# Patient Record
Sex: MALE | Race: WHITE | NOT HISPANIC OR LATINO
[De-identification: names, ages, dates, MRNs, and addresses within clinical notes are randomized per-mention and may not be internally consistent; named-entity substitution may affect disease eponyms.]

---

## 2023-02-21 ENCOUNTER — NON-APPOINTMENT (OUTPATIENT)
Age: 48
End: 2023-02-21

## 2023-02-21 ENCOUNTER — APPOINTMENT (OUTPATIENT)
Dept: PHYSICAL MEDICINE AND REHAB | Facility: CLINIC | Age: 48
End: 2023-02-21
Payer: COMMERCIAL

## 2023-02-21 DIAGNOSIS — Z87.39 PERSONAL HISTORY OF OTHER DISEASES OF THE MUSCULOSKELETAL SYSTEM AND CONNECTIVE TISSUE: ICD-10-CM

## 2023-02-21 DIAGNOSIS — M50.20 OTHER CERVICAL DISC DISPLACEMENT, UNSPECIFIED CERVICAL REGION: ICD-10-CM

## 2023-02-21 PROBLEM — Z00.00 ENCOUNTER FOR PREVENTIVE HEALTH EXAMINATION: Status: ACTIVE | Noted: 2023-02-21

## 2023-02-21 PROCEDURE — 72040 X-RAY EXAM NECK SPINE 2-3 VW: CPT

## 2023-02-21 PROCEDURE — 99203 OFFICE O/P NEW LOW 30 MIN: CPT

## 2023-02-21 RX ORDER — LIDOCAINE 5% 700 MG/1
5 PATCH TOPICAL
Qty: 10 | Refills: 0 | Status: DISCONTINUED | COMMUNITY
Start: 2023-01-20

## 2023-02-21 RX ORDER — COLCHICINE 0.6 MG/1
CAPSULE ORAL
Refills: 0 | Status: DISCONTINUED | COMMUNITY

## 2023-02-21 RX ORDER — MOMETASONE FUROATE 1 MG/G
0.1 OINTMENT TOPICAL
Qty: 45 | Refills: 0 | Status: DISCONTINUED | COMMUNITY
Start: 2022-08-24

## 2023-02-21 RX ORDER — COLCHICINE 0.6 MG/1
0.6 TABLET ORAL
Qty: 60 | Refills: 0 | Status: DISCONTINUED | COMMUNITY
Start: 2023-01-16

## 2023-02-21 RX ORDER — INDOMETHACIN 25 MG/1
25 CAPSULE ORAL
Qty: 60 | Refills: 0 | Status: DISCONTINUED | COMMUNITY
Start: 2022-11-11

## 2023-02-21 RX ORDER — FEBUXOSTAT 40 MG/1
40 TABLET ORAL
Qty: 30 | Refills: 0 | Status: DISCONTINUED | COMMUNITY
Start: 2022-12-29

## 2023-02-21 RX ORDER — COLCHICINE 0.6 MG/1
0.6 CAPSULE ORAL
Qty: 60 | Refills: 0 | Status: DISCONTINUED | COMMUNITY
Start: 2022-11-29

## 2023-02-21 RX ORDER — MELOXICAM 7.5 MG/1
7.5 TABLET ORAL TWICE DAILY
Qty: 50 | Refills: 0 | Status: ACTIVE | COMMUNITY
Start: 2023-02-21 | End: 1900-01-01

## 2023-02-21 RX ORDER — COLCHICINE 0.6 MG/1
0.6 CAPSULE ORAL
Refills: 0 | Status: ACTIVE | COMMUNITY

## 2023-02-21 RX ORDER — METHYLPREDNISOLONE 4 MG/1
4 TABLET ORAL
Qty: 21 | Refills: 0 | Status: DISCONTINUED | COMMUNITY
Start: 2023-01-20

## 2023-02-21 RX ORDER — CYCLOBENZAPRINE HYDROCHLORIDE 5 MG/1
5 TABLET, FILM COATED ORAL
Qty: 30 | Refills: 0 | Status: DISCONTINUED | COMMUNITY
Start: 2023-01-20

## 2023-02-21 NOTE — HISTORY OF PRESENT ILLNESS
[FreeTextEntry1] : Location: back \par Severity: 2/10\par Duration: 4 wk\par Context: moved recently but movers left everything outside so he had to lift it inside.  Also his kid stole his pillow. Went to urgent care and got steroids and muscle relaxer.\par Aggravating Factors: moving\par Alleviating Factors: steroids, rest\par Associated Symptoms: denies weight loss, fever, chills, change in bowel/bladder habits, weakness, numbness/tingling, +radiation down right arm initially but improving\par Prior Studies:\par

## 2023-02-21 NOTE — PHYSICAL EXAM
[FreeTextEntry1] : NEVILLE is a 47 year   old male \par \par Constitutional: healthy appearing, NAD, and obese\par \par NECK\par ROM: flexion to 30, ext to 30, rotation to 60 deg bilat\par \par Inspection: no erythema, warmth\par Spine: no TTP in spinous process\par Soft tissue palpation: no TTP in cervical paraspinals, rhomboids, trapezius\par \par 5/5 bilateral elb flex/ext, WE, finger abd/flex\par sensation intact in bilat UE\par reflexes:  biceps and triceps 0 bilat\par \par Special tests: neg Spurling, Landis\par \par

## 2023-02-21 NOTE — DATA REVIEWED
[FreeTextEntry1] : office xrays of cervical spine ap and lateral show mild DDD C5/6, loss of lordosis, and tilting to right.\par

## 2023-02-21 NOTE — ASSESSMENT
[FreeTextEntry1] : Discussed diagnosis and treatment plan including PT.\par Ice area often allyson initially in the future\par Taught WF exercise\par Adjust computer setup.  Avoid looking down\par Get back in shape slowly\par \par f/u 1 month

## 2023-03-24 ENCOUNTER — RX RENEWAL (OUTPATIENT)
Age: 48
End: 2023-03-24